# Patient Record
Sex: FEMALE | Race: WHITE | NOT HISPANIC OR LATINO | URBAN - METROPOLITAN AREA
[De-identification: names, ages, dates, MRNs, and addresses within clinical notes are randomized per-mention and may not be internally consistent; named-entity substitution may affect disease eponyms.]

---

## 2022-11-10 ENCOUNTER — APPOINTMENT (OUTPATIENT)
Dept: WOMENS IMAGING | Facility: HOSPITAL | Age: 32
End: 2022-11-10

## 2022-11-10 PROCEDURE — 77067 SCR MAMMO BI INCL CAD: CPT | Performed by: RADIOLOGY

## 2022-11-10 PROCEDURE — 77063 BREAST TOMOSYNTHESIS BI: CPT | Performed by: RADIOLOGY

## 2024-05-30 ENCOUNTER — NURSE TRIAGE (OUTPATIENT)
Dept: CALL CENTER | Facility: HOSPITAL | Age: 34
End: 2024-05-30
Payer: COMMERCIAL

## 2024-05-30 ENCOUNTER — HOSPITAL ENCOUNTER (EMERGENCY)
Facility: HOSPITAL | Age: 34
Discharge: HOME | End: 2024-05-30
Attending: EMERGENCY MEDICINE
Payer: COMMERCIAL

## 2024-05-30 VITALS
WEIGHT: 125 LBS | SYSTOLIC BLOOD PRESSURE: 115 MMHG | HEART RATE: 104 BPM | OXYGEN SATURATION: 97 % | HEIGHT: 63 IN | DIASTOLIC BLOOD PRESSURE: 85 MMHG | RESPIRATION RATE: 18 BRPM | TEMPERATURE: 97.8 F | BODY MASS INDEX: 22.15 KG/M2

## 2024-05-30 DIAGNOSIS — J06.9 VIRAL URI: Primary | ICD-10-CM

## 2024-05-30 LAB — STREP A PCR: NOT DETECTED

## 2024-05-30 PROCEDURE — 99283 EMERGENCY DEPT VISIT LOW MDM: CPT

## 2024-05-30 PROCEDURE — 87651 STREP A DNA AMP PROBE: CPT | Performed by: EMERGENCY MEDICINE

## 2024-05-30 PROCEDURE — 99283 EMERGENCY DEPT VISIT LOW MDM: CPT | Performed by: EMERGENCY MEDICINE

## 2024-05-30 NOTE — FSED PROVIDER NOTE
Subjective   History of Present Illness  Patient is complaining of a sore throat that started this morning.  Patient has minimal cough no congestion no fevers no chills no body aches.      Review of Systems   HENT:  Positive for sore throat.        Past Medical History:   Diagnosis Date    Leaky heart valve     Tachycardia        Allergies   Allergen Reactions    Penicillins Other (See Comments)     FAMILY ALLERGY    Robitussin Dm Max Day-Night Hives       Past Surgical History:   Procedure Laterality Date    CLEFT LIP REPAIR      CLEFT LIP REVISION      EAR TUBES      WISDOM TOOTH EXTRACTION         History reviewed. No pertinent family history.    Social History     Socioeconomic History    Marital status: Single   Tobacco Use    Smoking status: Never     Passive exposure: Never    Smokeless tobacco: Never   Vaping Use    Vaping status: Never Used   Substance and Sexual Activity    Alcohol use: Never    Drug use: Defer    Sexual activity: Defer           Objective   Physical Exam  Vitals and nursing note reviewed.   Constitutional:       General: She is not in acute distress.     Appearance: She is well-developed. She is not ill-appearing, toxic-appearing or diaphoretic.   HENT:      Head: Normocephalic and atraumatic.      Right Ear: Tympanic membrane normal. No drainage, swelling or tenderness. No middle ear effusion. Tympanic membrane is not erythematous.      Left Ear: Tympanic membrane normal. No drainage, swelling or tenderness.  No middle ear effusion. Tympanic membrane is not erythematous.      Nose: No congestion or rhinorrhea.      Mouth/Throat:      Mouth: No oral lesions.      Pharynx: No pharyngeal swelling, oropharyngeal exudate, posterior oropharyngeal erythema or uvula swelling.      Tonsils: No tonsillar exudate or tonsillar abscesses.   Eyes:      Extraocular Movements:      Right eye: Normal extraocular motion.      Left eye: Normal extraocular motion.      Conjunctiva/sclera: Conjunctivae  normal.      Pupils: Pupils are equal, round, and reactive to light.   Cardiovascular:      Rate and Rhythm: Normal rate and regular rhythm.      Heart sounds: Normal heart sounds.   Pulmonary:      Effort: Pulmonary effort is normal. No respiratory distress.      Breath sounds: Normal breath sounds. No stridor. No wheezing, rhonchi or rales.   Chest:      Chest wall: No tenderness.   Abdominal:      General: Bowel sounds are normal.   Musculoskeletal:      Cervical back: Normal range of motion and neck supple.   Lymphadenopathy:      Cervical: Cervical adenopathy present.   Skin:     General: Skin is warm and dry.      Capillary Refill: Capillary refill takes less than 2 seconds.   Neurological:      Mental Status: She is alert and oriented to person, place, and time.         Procedures           ED Course                                           Medical Decision Making  Patient's strep is negative she has a viral pharyngitis.  She will be given instructions on what she can take to help her.  If she does these she should feel better while the virus is on board and she can take zinc and vitamin C and vitamin D which will help as well.    Amount and/or Complexity of Data Reviewed  Labs:      Details: Strep screen is negative        Final diagnoses:   Viral URI       ED Disposition  ED Disposition       ED Disposition   Discharge    Condition   Stable    Comment   --               Provider, No Known  [facility]  [place] [address]  [phone]    In 1 week           Medication List      No changes were made to your prescriptions during this visit.

## 2024-05-30 NOTE — TELEPHONE ENCOUNTER
"Caller reported thinks has strep throat, wanting to know if she can be seen at [facility].  Advised caller the facility is a  and ER, the ER is open 24 hours a day and she could be seen.   Reason for Disposition   Health Information question, no triage required and triager able to answer question    Additional Information   Negative: [1] Caller is not with the adult (patient) AND [2] reporting urgent symptoms   Negative: Lab result questions   Negative: Medication questions   Negative: Caller can't be reached by phone   Negative: Caller has already spoken to PCP or another triager   Negative: RN needs further essential information from caller in order to complete triage   Negative: Requesting regular office appointment   Negative: [1] Caller requesting NON-URGENT health information AND [2] PCP's office is the best resource    Answer Assessment - Initial Assessment Questions  1. REASON FOR CALL or QUESTION: \"What is your reason for calling today?\" or \"How can I best help you?\" or \"What question do you have that I can help answer?\"      Caller wanting to know if goes to [facility] if she can be seen. Thinks she has strep. Denied fevers, reported tonsils look red.    Protocols used: Information Only Call-ADULT-    "

## 2024-05-30 NOTE — DISCHARGE INSTRUCTIONS
Take vitamin C 1000 mg, vitamin D 5000 international units, and zinc 50 mg every day while you are feeling sick.  Take Tylenol 650 mg every 4 hours on its own schedule around-the-clock.  Take ibuprofen 400 mg every 6 hours as needed for pain and discomfort around-the-clock on its own schedule.  You do not have to alternate these they can even be taken at the same time.  Use throat lozenges to help keep your throat moist.  Drink plenty of fluids and get plenty of rest.

## 2024-05-30 NOTE — Clinical Note
[facility] NORAED PARRIS  [id number]  [place] [address]    Nat Holcomb was seen and treated in our emergency department on 5/30/2024.  She may return to work on 06/01/2024.         Thank you for choosing [facility].    Blue Valdes MD